# Patient Record
Sex: MALE | Race: WHITE | NOT HISPANIC OR LATINO | URBAN - METROPOLITAN AREA
[De-identification: names, ages, dates, MRNs, and addresses within clinical notes are randomized per-mention and may not be internally consistent; named-entity substitution may affect disease eponyms.]

---

## 2017-04-17 ENCOUNTER — IMPORTED ENCOUNTER (OUTPATIENT)
Dept: URBAN - METROPOLITAN AREA CLINIC 50 | Facility: CLINIC | Age: 67
End: 2017-04-17

## 2017-04-27 ENCOUNTER — IMPORTED ENCOUNTER (OUTPATIENT)
Dept: URBAN - METROPOLITAN AREA CLINIC 50 | Facility: CLINIC | Age: 67
End: 2017-04-27

## 2017-05-01 ENCOUNTER — IMPORTED ENCOUNTER (OUTPATIENT)
Dept: URBAN - METROPOLITAN AREA CLINIC 50 | Facility: CLINIC | Age: 67
End: 2017-05-01

## 2017-05-09 ENCOUNTER — IMPORTED ENCOUNTER (OUTPATIENT)
Dept: URBAN - METROPOLITAN AREA CLINIC 50 | Facility: CLINIC | Age: 67
End: 2017-05-09

## 2017-05-10 ENCOUNTER — IMPORTED ENCOUNTER (OUTPATIENT)
Dept: URBAN - METROPOLITAN AREA CLINIC 50 | Facility: CLINIC | Age: 67
End: 2017-05-10

## 2017-05-12 ENCOUNTER — IMPORTED ENCOUNTER (OUTPATIENT)
Dept: URBAN - METROPOLITAN AREA CLINIC 50 | Facility: CLINIC | Age: 67
End: 2017-05-12

## 2017-05-19 ENCOUNTER — IMPORTED ENCOUNTER (OUTPATIENT)
Dept: URBAN - METROPOLITAN AREA CLINIC 50 | Facility: CLINIC | Age: 67
End: 2017-05-19

## 2017-06-01 ENCOUNTER — IMPORTED ENCOUNTER (OUTPATIENT)
Dept: URBAN - METROPOLITAN AREA CLINIC 50 | Facility: CLINIC | Age: 67
End: 2017-06-01

## 2017-06-06 ENCOUNTER — IMPORTED ENCOUNTER (OUTPATIENT)
Dept: URBAN - METROPOLITAN AREA CLINIC 50 | Facility: CLINIC | Age: 67
End: 2017-06-06

## 2017-06-27 ENCOUNTER — IMPORTED ENCOUNTER (OUTPATIENT)
Dept: URBAN - METROPOLITAN AREA CLINIC 50 | Facility: CLINIC | Age: 67
End: 2017-06-27

## 2017-08-24 ENCOUNTER — IMPORTED ENCOUNTER (OUTPATIENT)
Dept: URBAN - METROPOLITAN AREA CLINIC 50 | Facility: CLINIC | Age: 67
End: 2017-08-24

## 2017-08-29 ENCOUNTER — IMPORTED ENCOUNTER (OUTPATIENT)
Dept: URBAN - METROPOLITAN AREA CLINIC 50 | Facility: CLINIC | Age: 67
End: 2017-08-29

## 2017-09-01 ENCOUNTER — IMPORTED ENCOUNTER (OUTPATIENT)
Dept: URBAN - METROPOLITAN AREA CLINIC 50 | Facility: CLINIC | Age: 67
End: 2017-09-01

## 2017-09-05 ENCOUNTER — IMPORTED ENCOUNTER (OUTPATIENT)
Dept: URBAN - METROPOLITAN AREA CLINIC 50 | Facility: CLINIC | Age: 67
End: 2017-09-05

## 2017-09-14 ENCOUNTER — IMPORTED ENCOUNTER (OUTPATIENT)
Dept: URBAN - METROPOLITAN AREA CLINIC 50 | Facility: CLINIC | Age: 67
End: 2017-09-14

## 2017-09-26 ENCOUNTER — IMPORTED ENCOUNTER (OUTPATIENT)
Dept: URBAN - METROPOLITAN AREA CLINIC 50 | Facility: CLINIC | Age: 67
End: 2017-09-26

## 2017-09-28 ENCOUNTER — IMPORTED ENCOUNTER (OUTPATIENT)
Dept: URBAN - METROPOLITAN AREA CLINIC 50 | Facility: CLINIC | Age: 67
End: 2017-09-28

## 2017-09-29 ENCOUNTER — IMPORTED ENCOUNTER (OUTPATIENT)
Dept: URBAN - METROPOLITAN AREA CLINIC 50 | Facility: CLINIC | Age: 67
End: 2017-09-29

## 2017-10-13 ENCOUNTER — IMPORTED ENCOUNTER (OUTPATIENT)
Dept: URBAN - METROPOLITAN AREA CLINIC 50 | Facility: CLINIC | Age: 67
End: 2017-10-13

## 2017-12-22 ENCOUNTER — IMPORTED ENCOUNTER (OUTPATIENT)
Dept: URBAN - METROPOLITAN AREA CLINIC 50 | Facility: CLINIC | Age: 67
End: 2017-12-22

## 2018-01-05 ENCOUNTER — IMPORTED ENCOUNTER (OUTPATIENT)
Dept: URBAN - METROPOLITAN AREA CLINIC 50 | Facility: CLINIC | Age: 68
End: 2018-01-05

## 2018-01-30 ENCOUNTER — IMPORTED ENCOUNTER (OUTPATIENT)
Dept: URBAN - METROPOLITAN AREA CLINIC 50 | Facility: CLINIC | Age: 68
End: 2018-01-30

## 2018-05-07 ENCOUNTER — IMPORTED ENCOUNTER (OUTPATIENT)
Dept: URBAN - METROPOLITAN AREA CLINIC 50 | Facility: CLINIC | Age: 68
End: 2018-05-07

## 2018-05-15 ENCOUNTER — IMPORTED ENCOUNTER (OUTPATIENT)
Dept: URBAN - METROPOLITAN AREA CLINIC 50 | Facility: CLINIC | Age: 68
End: 2018-05-15

## 2018-05-29 ENCOUNTER — IMPORTED ENCOUNTER (OUTPATIENT)
Dept: URBAN - METROPOLITAN AREA CLINIC 50 | Facility: CLINIC | Age: 68
End: 2018-05-29

## 2018-06-01 ENCOUNTER — IMPORTED ENCOUNTER (OUTPATIENT)
Dept: URBAN - METROPOLITAN AREA CLINIC 50 | Facility: CLINIC | Age: 68
End: 2018-06-01

## 2018-06-04 ENCOUNTER — IMPORTED ENCOUNTER (OUTPATIENT)
Dept: URBAN - METROPOLITAN AREA CLINIC 50 | Facility: CLINIC | Age: 68
End: 2018-06-04

## 2018-06-05 ENCOUNTER — IMPORTED ENCOUNTER (OUTPATIENT)
Dept: URBAN - METROPOLITAN AREA CLINIC 50 | Facility: CLINIC | Age: 68
End: 2018-06-05

## 2018-06-13 ENCOUNTER — IMPORTED ENCOUNTER (OUTPATIENT)
Dept: URBAN - METROPOLITAN AREA CLINIC 50 | Facility: CLINIC | Age: 68
End: 2018-06-13

## 2018-07-11 ENCOUNTER — IMPORTED ENCOUNTER (OUTPATIENT)
Dept: URBAN - METROPOLITAN AREA CLINIC 50 | Facility: CLINIC | Age: 68
End: 2018-07-11

## 2018-07-13 ENCOUNTER — IMPORTED ENCOUNTER (OUTPATIENT)
Dept: URBAN - METROPOLITAN AREA CLINIC 50 | Facility: CLINIC | Age: 68
End: 2018-07-13

## 2018-07-25 ENCOUNTER — IMPORTED ENCOUNTER (OUTPATIENT)
Dept: URBAN - METROPOLITAN AREA CLINIC 50 | Facility: CLINIC | Age: 68
End: 2018-07-25

## 2018-07-26 ENCOUNTER — IMPORTED ENCOUNTER (OUTPATIENT)
Dept: URBAN - METROPOLITAN AREA CLINIC 50 | Facility: CLINIC | Age: 68
End: 2018-07-26

## 2019-07-10 ENCOUNTER — IMPORTED ENCOUNTER (OUTPATIENT)
Dept: URBAN - METROPOLITAN AREA CLINIC 50 | Facility: CLINIC | Age: 69
End: 2019-07-10

## 2019-07-25 ENCOUNTER — IMPORTED ENCOUNTER (OUTPATIENT)
Dept: URBAN - METROPOLITAN AREA CLINIC 50 | Facility: CLINIC | Age: 69
End: 2019-07-25

## 2019-09-20 ENCOUNTER — IMPORTED ENCOUNTER (OUTPATIENT)
Dept: URBAN - METROPOLITAN AREA CLINIC 50 | Facility: CLINIC | Age: 69
End: 2019-09-20

## 2019-12-02 ENCOUNTER — IMPORTED ENCOUNTER (OUTPATIENT)
Dept: URBAN - METROPOLITAN AREA CLINIC 50 | Facility: CLINIC | Age: 69
End: 2019-12-02

## 2020-01-08 NOTE — PATIENT DISCUSSION
Patient advised of the right to post-operative care by the surgeon. Patient is fully informed of, and agreed to, co-management with their primary optometric physician. Post-operative care by the surgeon is not medically necessary and co-management is clinically appropriate. Patient has received itemization of fees related to cataract surgery. Transfer of care letter completed for the patient. Transfer care of left eye to Dr. Irlanda Waldron on 1.9.20. Patient instructed to call immediately if any new distortion, blurring, decreased vision or eye pain.

## 2020-01-08 NOTE — PATIENT DISCUSSION
Patient advised of the right to post-operative care by the surgeon. Patient is fully informed of, and agreed to, co-management with their primary optometric physician. Post-operative care by the surgeon is not medically necessary and co-management is clinically appropriate. Patient has received itemization of fees related to cataract surgery. Transfer of care letter completed for the patient. Transfer care of left eye to Dr. Cynthia Villagomez on 1.9.20. Patient instructed to call immediately if any new distortion, blurring, decreased vision or eye pain.

## 2020-01-15 NOTE — PATIENT DISCUSSION
Patient advised of the right to post-operative care by the surgeon. Patient is fully informed of, and agreed to, co-management with their primary optometric physician. Post-operative care by the surgeon is not medically necessary and co-management is clinically appropriate. Patient has received itemization of fees related to cataract surgery. Transfer of care letter completed for the patient. Transfer care of left eye to Dr. Taj Ferguson on 1.9.20. Patient instructed to call immediately if any new distortion, blurring, decreased vision or eye pain.

## 2020-01-15 NOTE — PATIENT DISCUSSION
The types of intraocular lenses were reviewed with the patient along with a discussion of their various strengths and weaknesses. Pt wants to proceed with surgery OD Basic Goal -3.00 (kcn).

## 2020-02-26 NOTE — PATIENT DISCUSSION
Patient advised of the right to post-operative care by the surgeon. Patient is fully informed of, and agreed to, co-management with their primary optometric physician. Post-operative care by the surgeon is not medically necessary and co-management is clinically appropriate. Patient has received itemization of fees related to cataract surgery. Transfer of care letter completed for the patient. Transfer care of right eye to Dr. Dave on 2.27.20. Patient instructed to call immediately if any new distortion, blurring, decreased vision or eye pain.

## 2020-02-26 NOTE — PATIENT DISCUSSION
Patient advised of the right to post-operative care by the surgeon. Patient is fully informed of, and agreed to, co-management with their primary optometric physician. Post-operative care by the surgeon is not medically necessary and co-management is clinically appropriate. Patient has received itemization of fees related to cataract surgery. Transfer of care letter completed for the patient. Transfer care of right eye to Dr. Freddy Kamara on 2.27.20. Patient instructed to call immediately if any new distortion, blurring, decreased vision or eye pain.

## 2020-05-21 NOTE — PROCEDURE NOTE: SURGICAL
Prior to commencing surgery, Dr. Manjula Dudley confirmed patient identification, surgical procedure, site and side. Following topical proparacaine anesthesia, the patient was positioned at the YAG laser, a contact lens was coupled to the cornea of the right eye with methylcellulose and an axial posterior capsulotomy was performed without complication using 3.2 Mj x 52. Attention was turned to the left eye and a contact lens was coupled to the cornea of the left eye with methylcellulose and an axial posterior capsulotomy was performed without complication using 3.2 Mj x 60. Excess methylcellulose was washed from both eyes, one drop of Alphagan was instilled in each eye and the patient returned to the holding area having tolerated the procedure well and without complication. <br />[facility]:[id number]<QM />

## 2021-02-08 ENCOUNTER — IMPORTED ENCOUNTER (OUTPATIENT)
Dept: URBAN - METROPOLITAN AREA CLINIC 50 | Facility: CLINIC | Age: 71
End: 2021-02-08

## 2021-02-15 ENCOUNTER — IMPORTED ENCOUNTER (OUTPATIENT)
Dept: URBAN - METROPOLITAN AREA CLINIC 50 | Facility: CLINIC | Age: 71
End: 2021-02-15

## 2021-03-02 ENCOUNTER — IMPORTED ENCOUNTER (OUTPATIENT)
Dept: URBAN - METROPOLITAN AREA CLINIC 50 | Facility: CLINIC | Age: 71
End: 2021-03-02

## 2021-04-05 NOTE — PATIENT DISCUSSION
Discussed the risks/benefits of laser capsulotomy. Laser recommended. Patient elects to proceed OD.
Good postoperative appearance.
Monitor w/ Dr. Rae Tobias.
check OS day of YAG LASER to determine if patient ready.
monitor , patient to be out of [facility] CL's 24 hours prior to [address]
patients needs to be out of CL' 24 hours prior to [address].
Opt out

## 2021-04-17 ASSESSMENT — PACHYMETRY
OD_CT_UM: 642
OS_CT_UM: 665
OS_CT_UM: 665
OD_CT_UM: 642
OD_CT_UM: 642
OS_CT_UM: 665
OD_CT_UM: 642
OS_CT_UM: 665
OS_CT_UM: 665
OD_CT_UM: 642
OD_CT_UM: 642
OS_CT_UM: 665
OS_CT_UM: 665
OD_CT_UM: 642
OS_CT_UM: 665
OD_CT_UM: 642
OS_CT_UM: 665
OD_CT_UM: 642
OS_CT_UM: 665
OD_CT_UM: 642
OS_CT_UM: 665
OS_CT_UM: 665
OD_CT_UM: 642
OS_CT_UM: 665
OS_CT_UM: 665
OD_CT_UM: 642
OS_CT_UM: 665
OS_CT_UM: 665
OD_CT_UM: 642
OS_CT_UM: 665
OD_CT_UM: 642
OS_CT_UM: 665
OD_CT_UM: 642
OD_CT_UM: 642
OS_CT_UM: 665
OS_CT_UM: 665
OD_CT_UM: 642
OS_CT_UM: 665
OD_CT_UM: 642
OD_CT_UM: 642
OS_CT_UM: 665
OD_CT_UM: 642
OS_CT_UM: 665
OD_CT_UM: 642
OS_CT_UM: 665
OD_CT_UM: 642
OS_CT_UM: 665
OS_CT_UM: 665
OD_CT_UM: 642
OS_CT_UM: 665
OD_CT_UM: 642
OD_CT_UM: 642
OS_CT_UM: 665
OD_CT_UM: 642
OS_CT_UM: 665

## 2021-04-17 ASSESSMENT — VISUAL ACUITY
OD_BAT: 20/70
OS_PH: 20/30
OS_CC: 20/30+
OD_SC: 20/50+1
OS_CC: 20/25-2
OS_OTHER: 20/80. 20/80.
OD_SC: 20/200-
OS_PH: 20/30
OS_SC: 20/25-
OS_CC: 20/40
OD_SC: 20/40-
OD_BAT: 20/40
OS_PH: 20/30
OD_SC: 20/40-+
OS_CC: 20/400
OS_SC: 20/30
OD_OTHER: 20/70. 20/400.
OS_OTHER: 20/70. 20/200.
OD_CC: J1+
OS_CC: J1
OD_CC: 20/40+
OS_SC: 20/100
OS_CC: J1@ 16 IN
OD_SC: 20/40-
OD_CC: 20/25-2
OD_OTHER: 20/40. 20/60.
OS_CC: J1+
OD_CC: J1+
OS_BAT: 20/70
OS_SC: 20/30+
OS_SC: 20/30
OD_SC: 20/30-2
OS_BAT: 20/80
OS_OTHER: 20/80. 20/80.
OD_CC: J1@ 16 IN
OD_SC: 20/30
OD_SC: 20/50
OD_CC: 20/30
OD_SC: 20/40
OS_BAT: 20/60
OS_SC: 20/30
OD_BAT: 20/70
OD_SC: 20/30-
OS_CC: J1+
OS_CC: J1
OD_CC: 20/40+
OS_BAT: 20/80
OS_CC: 20/25-
OS_SC: 20/50
OD_SC: 20/60-
OS_OTHER: 20/60. 20/200.
OS_CC: 20/25-
OD_OTHER: 20/70. 20/400.
OS_BAT: 20/50
OS_OTHER: 20/50. 20/60.
OD_CC: 20/40
OS_SC: 20/40
OS_SC: 20/30-
OS_OTHER: 20/80. 20/400.
OD_CC: J1
OD_PH: 20/40
OD_CC: J1
OD_CC: J1
OS_CC: J1+
OD_CC: 20/40+
OS_CC: 20/30
OD_CC: 20/40
OS_CC: 20/40
OS_CC: 20/30+
OD_CC: J1+
OD_CC: 20/30-1
OS_BAT: 20/80
OS_CC: 20/40+2
OS_CC: J1
OS_SC: 20/30

## 2021-04-17 ASSESSMENT — TONOMETRY
OD_IOP_MMHG: 17
OS_IOP_MMHG: 17
OS_IOP_MMHG: 24
OS_IOP_MMHG: 11
OS_IOP_MMHG: 16
OS_IOP_MMHG: 20
OD_IOP_MMHG: 22
OD_IOP_MMHG: 29
OS_IOP_MMHG: 15
OS_IOP_MMHG: 22
OD_IOP_MMHG: 16
OD_IOP_MMHG: 11
OS_IOP_MMHG: 21
OD_IOP_MMHG: 13
OD_IOP_MMHG: 21
OD_IOP_MMHG: 17
OS_IOP_MMHG: 28
OS_IOP_MMHG: 18
OS_IOP_MMHG: 16
OD_IOP_MMHG: 15
OD_IOP_MMHG: 15
OS_IOP_MMHG: 16
OD_IOP_MMHG: 28
OS_IOP_MMHG: 12
OD_IOP_MMHG: 21
OD_IOP_MMHG: 19
OS_IOP_MMHG: 17
OS_IOP_MMHG: 20
OS_IOP_MMHG: 16
OS_IOP_MMHG: 20
OS_IOP_MMHG: 12
OD_IOP_MMHG: 16
OS_IOP_MMHG: 15
OS_IOP_MMHG: 16
OD_IOP_MMHG: 24
OS_IOP_MMHG: 19
OD_IOP_MMHG: 27
OD_IOP_MMHG: 15
OS_IOP_MMHG: 13
OS_IOP_MMHG: 22
OD_IOP_MMHG: 21
OD_IOP_MMHG: 17
OD_IOP_MMHG: 20
OS_IOP_MMHG: 17
OS_IOP_MMHG: 16
OS_IOP_MMHG: 20
OD_IOP_MMHG: 16
OS_IOP_MMHG: 24
OD_IOP_MMHG: 20
OD_IOP_MMHG: 20
OS_IOP_MMHG: 21
OD_IOP_MMHG: 21
OS_IOP_MMHG: 12
OD_IOP_MMHG: 25
OD_IOP_MMHG: 14
OD_IOP_MMHG: 19
OD_IOP_MMHG: 18
OD_IOP_MMHG: 16
OD_IOP_MMHG: 21
OS_IOP_MMHG: 18
OD_IOP_MMHG: 17
OD_IOP_MMHG: 10
OD_IOP_MMHG: 17
OD_IOP_MMHG: 12

## 2021-09-03 NOTE — PATIENT DISCUSSION
Mild corneal edema. Otherwise good postoperative appearance. Pt is calling back she has additional imaging scheduled for Sept 20.

## 2022-03-03 ENCOUNTER — PREPPED CHART (OUTPATIENT)
Dept: URBAN - METROPOLITAN AREA CLINIC 49 | Facility: CLINIC | Age: 72
End: 2022-03-03

## 2022-03-07 ENCOUNTER — COMPREHENSIVE EXAM (OUTPATIENT)
Dept: URBAN - METROPOLITAN AREA CLINIC 49 | Facility: CLINIC | Age: 72
End: 2022-03-07

## 2022-03-07 DIAGNOSIS — E11.9: ICD-10-CM

## 2022-03-07 DIAGNOSIS — H18.513: ICD-10-CM

## 2022-03-07 DIAGNOSIS — H43.813: ICD-10-CM

## 2022-03-07 PROCEDURE — 92014 COMPRE OPH EXAM EST PT 1/>: CPT

## 2022-03-07 ASSESSMENT — VISUAL ACUITY
OS_CC: 20/25-1
OU_CC: J1+
OS_GLARE: 20/25
OD_CC: 20/25-1
OS_GLARE: 20/25

## 2022-03-07 ASSESSMENT — TONOMETRY
OD_IOP_MMHG: 20
OS_IOP_MMHG: 20
OS_IOP_MMHG: 16
OD_IOP_MMHG: 16

## 2022-05-31 NOTE — PATIENT DISCUSSION
monitor , patient to be out of [facility] CL's 24 hours prior to [address]. Zyclara Pregnancy And Lactation Text: This medication is Pregnancy Category C. It is unknown if this medication is excreted in breast milk.

## 2022-07-19 ENCOUNTER — CONTACT LENSES/GLASSES VISIT (OUTPATIENT)
Dept: URBAN - METROPOLITAN AREA CLINIC 48 | Facility: CLINIC | Age: 72
End: 2022-07-19

## 2022-07-19 DIAGNOSIS — H18.513: ICD-10-CM

## 2022-07-19 DIAGNOSIS — Z97.3: ICD-10-CM

## 2022-07-19 PROCEDURE — 92310-4E ESTABLISHED CL PATIENT RGP SINGLE VISION HARD LENS EVALUATION

## 2022-07-19 ASSESSMENT — VISUAL ACUITY
OS_CC: 20/25+2
OD_PH: 20/20-2
OU_CC: 20/25+2
OU_CC: J1+
OD_CC: 20/30

## 2024-07-22 ENCOUNTER — COMPREHENSIVE EXAM (OUTPATIENT)
Dept: URBAN - METROPOLITAN AREA CLINIC 49 | Facility: LOCATION | Age: 74
End: 2024-07-22

## 2024-07-22 ASSESSMENT — VISUAL ACUITY
OS_SC: 20/30-1
OS_GLARE: 20/40
OS_GLARE: 20/20
OU_CC: J1+
OU_SC: 20/30-1
OD_SC: 20/40-1
OS_PH: 20/20-1
OD_PH: 20/20-1

## 2024-07-22 ASSESSMENT — TONOMETRY
OS_IOP_MMHG: 12
OD_IOP_MMHG: 16
OD_IOP_MMHG: 20
OS_IOP_MMHG: 16

## 2025-08-04 ENCOUNTER — COMPREHENSIVE EXAM (OUTPATIENT)
Age: 75
End: 2025-08-04

## 2025-08-04 DIAGNOSIS — H18.513: ICD-10-CM

## 2025-08-04 DIAGNOSIS — H02.834: ICD-10-CM

## 2025-08-04 DIAGNOSIS — Z94.7: ICD-10-CM

## 2025-08-04 DIAGNOSIS — H43.813: ICD-10-CM

## 2025-08-04 DIAGNOSIS — H02.831: ICD-10-CM

## 2025-08-04 DIAGNOSIS — H52.4: ICD-10-CM

## 2025-08-04 DIAGNOSIS — E11.9: ICD-10-CM

## 2025-08-04 PROCEDURE — 99214 OFFICE O/P EST MOD 30 MIN: CPT

## 2025-08-04 PROCEDURE — 92015 DETERMINE REFRACTIVE STATE: CPT
